# Patient Record
Sex: MALE | Race: WHITE | URBAN - METROPOLITAN AREA
[De-identification: names, ages, dates, MRNs, and addresses within clinical notes are randomized per-mention and may not be internally consistent; named-entity substitution may affect disease eponyms.]

---

## 2024-01-30 ENCOUNTER — LAB OUTSIDE AN ENCOUNTER (OUTPATIENT)
Dept: URBAN - METROPOLITAN AREA CLINIC 107 | Facility: CLINIC | Age: 24
End: 2024-01-30

## 2024-01-30 ENCOUNTER — OFFICE VISIT (OUTPATIENT)
Dept: URBAN - METROPOLITAN AREA CLINIC 107 | Facility: CLINIC | Age: 24
End: 2024-01-30
Payer: COMMERCIAL

## 2024-01-30 VITALS
DIASTOLIC BLOOD PRESSURE: 72 MMHG | RESPIRATION RATE: 18 BRPM | TEMPERATURE: 98.2 F | HEIGHT: 69 IN | HEART RATE: 64 BPM | BODY MASS INDEX: 23.11 KG/M2 | SYSTOLIC BLOOD PRESSURE: 116 MMHG | WEIGHT: 156 LBS

## 2024-01-30 DIAGNOSIS — R10.84 GENERALIZED ABDOMINAL PAIN: ICD-10-CM

## 2024-01-30 DIAGNOSIS — K58.0 IRRITABLE BOWEL SYNDROME WITH DIARRHEA: ICD-10-CM

## 2024-01-30 PROBLEM — 197125005: Status: ACTIVE | Noted: 2024-01-30

## 2024-01-30 PROCEDURE — 99204 OFFICE O/P NEW MOD 45 MIN: CPT | Performed by: STUDENT IN AN ORGANIZED HEALTH CARE EDUCATION/TRAINING PROGRAM

## 2024-01-30 NOTE — HPI-TODAY'S VISIT:
Initial 1/30/2024 Mr. Hanna is a 23-year-old male with history of anxiety/depression being referred to the GI clinic for abdominal pain associate with nausea and change in bowel habits. Labs 10/16/2023: WBC 4.3, hemoglobin 14.7, platelet 166, BUN 20, creatinine 0.8, sodium 138, potassium 4.4, liver enzymes unremarkable, testosterone and TSH within normal limits. Patient states that he has been having GI symptoms for several years.  He describes postprandial bowel movements that range in consistency from liquid to flaky to formed, no overt bleeding per rectum.  His stools are sometimes dark in color but not black.  He does not know any specific food triggers for this.  He does admit to increased abdominal gas and cramping.  On one occasion he did have heartburn however this spontaneously resolved.  He had a colonoscopy in 2020 that was overall unremarkable per patient, we will request this report.  He has no family history of colon cancer in his first-degree relatives.  He denies weight loss.  He denies rectal bleeding.  Denies dysphagia.  He does admit to having anxiety related to the symptoms.

## 2024-03-19 ENCOUNTER — OV EP (OUTPATIENT)
Dept: URBAN - METROPOLITAN AREA CLINIC 107 | Facility: CLINIC | Age: 24
End: 2024-03-19

## 2024-04-09 ENCOUNTER — OV EP (OUTPATIENT)
Dept: URBAN - METROPOLITAN AREA CLINIC 107 | Facility: CLINIC | Age: 24
End: 2024-04-09
Payer: COMMERCIAL

## 2024-04-09 VITALS
HEIGHT: 69 IN | SYSTOLIC BLOOD PRESSURE: 159 MMHG | WEIGHT: 152 LBS | DIASTOLIC BLOOD PRESSURE: 57 MMHG | HEART RATE: 63 BPM | BODY MASS INDEX: 22.51 KG/M2 | TEMPERATURE: 98.2 F

## 2024-04-09 DIAGNOSIS — R10.84 GENERALIZED ABDOMINAL PAIN: ICD-10-CM

## 2024-04-09 DIAGNOSIS — K62.5 BRIGHT RED BLOOD PER RECTUM: ICD-10-CM

## 2024-04-09 DIAGNOSIS — L91.8 SKIN TAG: ICD-10-CM

## 2024-04-09 DIAGNOSIS — K58.0 IRRITABLE BOWEL SYNDROME WITH DIARRHEA: ICD-10-CM

## 2024-04-09 PROCEDURE — 99214 OFFICE O/P EST MOD 30 MIN: CPT | Performed by: STUDENT IN AN ORGANIZED HEALTH CARE EDUCATION/TRAINING PROGRAM

## 2024-04-09 PROCEDURE — 46600 DIAGNOSTIC ANOSCOPY SPX: CPT | Performed by: STUDENT IN AN ORGANIZED HEALTH CARE EDUCATION/TRAINING PROGRAM

## 2024-04-09 RX ORDER — FEXOFENADINE HCL 60 MG/1
1 TABLET TABLET, FILM COATED ORAL TWICE A DAY
Status: ACTIVE | COMMUNITY

## 2024-04-09 NOTE — EXAM-PHYSICAL EXAM
Very small posterior skin tag. Anoscopy: No obvious hemorrhoids, rectal masses, or bleeding present. I did not get a thorough evaluation as anoscopy was not well tolerated.  NIESHA: Normal sphincter tone and rectal muscle upon bearing down. No tenderness on exam.  Chaperone present

## 2024-04-09 NOTE — HPI-OTHER HISTORIES
Colonoscopy 5/29/2020 was normal.  Random colon biopsy normal. Right upper quadrant ultrasound 2/6/2024 was normal. Labs 3/22/2024.  Celiac panel negative.  CBC, CMP normal.

## 2024-04-09 NOTE — HPI-TODAY'S VISIT:
23-year-old male here for follow-up appointment for abdominal pain.  Last seen 1/30/2024 for generalized abdominal pain and IBS with diarrhea.  Labs ordered for further evaluation.  Prior colonoscopy report requested.  Benefiber recommended to bulk stool and right upper quadrant ultrasound ordered for his abdominal pain.  Was prescribed Levsin in the past.  Today he reports continued altered bowel habits.  Has been doing well for the past few weeks up until a couple of days ago when diarrhea started. Has nausea in the morning,  Pain is periumbilical.   He was having some bleeding, stools are sometimes dark which is similar to previous. He does admit to having anxiety related to the symptoms.  Weight is up 4 pounds from his last appointment.  He has no family history of colon cancer in his first-degree relatives.

## 2024-05-10 ENCOUNTER — TELEPHONE ENCOUNTER (OUTPATIENT)
Dept: URBAN - METROPOLITAN AREA CLINIC 107 | Facility: CLINIC | Age: 24
End: 2024-05-10

## 2024-07-09 ENCOUNTER — OFFICE VISIT (OUTPATIENT)
Dept: URBAN - METROPOLITAN AREA CLINIC 107 | Facility: CLINIC | Age: 24
End: 2024-07-09
Payer: COMMERCIAL

## 2024-07-09 ENCOUNTER — LAB OUTSIDE AN ENCOUNTER (OUTPATIENT)
Dept: URBAN - METROPOLITAN AREA CLINIC 107 | Facility: CLINIC | Age: 24
End: 2024-07-09

## 2024-07-09 ENCOUNTER — TELEPHONE ENCOUNTER (OUTPATIENT)
Dept: URBAN - METROPOLITAN AREA CLINIC 107 | Facility: CLINIC | Age: 24
End: 2024-07-09

## 2024-07-09 ENCOUNTER — DASHBOARD ENCOUNTERS (OUTPATIENT)
Age: 24
End: 2024-07-09

## 2024-07-09 VITALS
BODY MASS INDEX: 24.68 KG/M2 | TEMPERATURE: 98.1 F | HEART RATE: 62 BPM | HEIGHT: 69 IN | SYSTOLIC BLOOD PRESSURE: 113 MMHG | WEIGHT: 166.6 LBS | DIASTOLIC BLOOD PRESSURE: 68 MMHG | OXYGEN SATURATION: 97 % | RESPIRATION RATE: 18 BRPM

## 2024-07-09 DIAGNOSIS — R10.13 EPIGASTRIC PAIN: ICD-10-CM

## 2024-07-09 DIAGNOSIS — K58.0 IRRITABLE BOWEL SYNDROME WITH DIARRHEA: ICD-10-CM

## 2024-07-09 DIAGNOSIS — R14.3 PASSING GAS: ICD-10-CM

## 2024-07-09 PROCEDURE — 99213 OFFICE O/P EST LOW 20 MIN: CPT | Performed by: STUDENT IN AN ORGANIZED HEALTH CARE EDUCATION/TRAINING PROGRAM

## 2024-07-09 RX ORDER — FEXOFENADINE HCL 60 MG/1
1 TABLET TABLET, FILM COATED ORAL TWICE A DAY
Status: ON HOLD | COMMUNITY

## 2024-07-09 RX ORDER — OMEGA-3/DHA/EPA/FISH OIL 120-180 MG
1 CAPSULE CAPSULE ORAL ONCE A DAY
Status: ACTIVE | COMMUNITY

## 2024-07-09 NOTE — HPI-TODAY'S VISIT:
23-year-old male here for follow-up appointment.    Last seen 4/9/2024 for IBS diarrhea, gas, rectal skin tag and bright red blood per rectum.  No bleeding noted on exam.  Advised start Benefiber daily, low FODMAP diet to identify food triggers.  Offered hyoscyamine he declined. Today he reports continued altered bowel habits. Has been doing well for the past few weeks up until a couple of days ago when diarrhea started. Has nausea in the morning, Pain is periumbilical. He was having some bleeding, stools are sometimes dark which is similar to previous. He does admit to having anxiety related to the symptoms. Weight is up 4 pounds from his last appointment.  1/30/2024 seen for generalized abdominal pain and IBS with diarrhea.  Labs ordered for further evaluation.  Prior colonoscopy report requested.  Benefiber recommended to bulk stool and right upper quadrant ultrasound ordered for his abdominal pain.  Was prescribed Levsin in the past. He has no family history of colon cancer in his first-degree relatives.  7/9/24.  He reports symptoms are unchanged. Tried taking the fiber for a week but no improvement. When he is feeling well he doesn't remeber to take the fiber.  He reports diarrhea this past week. Pain in his epigastric area and is constant.  He is trying to build muscle. He eats a bagel and cheese with santamaria.  Eats rice and broccoli in the [place]. Drinks protein shakes and milk.  Weight is up 14 pounds since his last appointment  He is not interested in any medicine to help his symptoms.

## 2024-07-15 ENCOUNTER — TELEPHONE ENCOUNTER (OUTPATIENT)
Dept: URBAN - METROPOLITAN AREA CLINIC 113 | Facility: CLINIC | Age: 24
End: 2024-07-15

## 2024-07-23 ENCOUNTER — CLAIMS CREATED FROM THE CLAIM WINDOW (OUTPATIENT)
Dept: URBAN - METROPOLITAN AREA CLINIC 4 | Facility: CLINIC | Age: 24
End: 2024-07-23
Payer: COMMERCIAL

## 2024-07-23 ENCOUNTER — CLAIMS CREATED FROM THE CLAIM WINDOW (OUTPATIENT)
Dept: URBAN - METROPOLITAN AREA SURGERY CENTER 25 | Facility: SURGERY CENTER | Age: 24
End: 2024-07-23
Payer: COMMERCIAL

## 2024-07-23 DIAGNOSIS — K31.89 GASTRIC FOVEOLAR HYPERPLASIA: ICD-10-CM

## 2024-07-23 DIAGNOSIS — K29.71 GASTRITIS, UNSPECIFIED, WITH BLEEDING: ICD-10-CM

## 2024-07-23 DIAGNOSIS — R19.7 DIARRHEA: ICD-10-CM

## 2024-07-23 DIAGNOSIS — K22.89 OTHER SPECIFIED DISEASE OF ESOPHAGUS: ICD-10-CM

## 2024-07-23 DIAGNOSIS — R10.13 EPIGASTRIC PAIN: ICD-10-CM

## 2024-07-23 DIAGNOSIS — K44.9 HIATAL HERNIA: ICD-10-CM

## 2024-07-23 DIAGNOSIS — R10.13 EPIGASTRIC ABDOMINAL PAIN: ICD-10-CM

## 2024-07-23 DIAGNOSIS — R19.7 DIARRHEA, UNSPECIFIED TYPE: ICD-10-CM

## 2024-07-23 DIAGNOSIS — K31.89 OTHER DISEASES OF STOMACH AND DUODENUM: ICD-10-CM

## 2024-07-23 PROCEDURE — 88312 SPECIAL STAINS GROUP 1: CPT | Performed by: PATHOLOGY

## 2024-07-23 PROCEDURE — 88305 TISSUE EXAM BY PATHOLOGIST: CPT | Performed by: PATHOLOGY

## 2024-07-23 PROCEDURE — 00731 ANES UPR GI NDSC PX NOS: CPT | Performed by: ANESTHESIOLOGY

## 2024-07-23 PROCEDURE — 00731 ANES UPR GI NDSC PX NOS: CPT | Performed by: NURSE ANESTHETIST, CERTIFIED REGISTERED

## 2024-07-23 PROCEDURE — 43239 EGD BIOPSY SINGLE/MULTIPLE: CPT | Performed by: STUDENT IN AN ORGANIZED HEALTH CARE EDUCATION/TRAINING PROGRAM

## 2024-07-23 RX ORDER — FEXOFENADINE HCL 60 MG/1
1 TABLET TABLET, FILM COATED ORAL TWICE A DAY
Status: ON HOLD | COMMUNITY

## 2024-07-23 RX ORDER — OMEGA-3/DHA/EPA/FISH OIL 120-180 MG
1 CAPSULE CAPSULE ORAL ONCE A DAY
Status: ACTIVE | COMMUNITY

## 2024-07-23 RX ORDER — LANSOPRAZOLE 30 MG/1
1 CAPSULE BEFORE A MEAL CAPSULE, DELAYED RELEASE ORAL TWICE A DAY
Qty: 180 CAPSULE | Refills: 1 | OUTPATIENT
Start: 2024-07-23

## 2024-07-31 ENCOUNTER — TELEPHONE ENCOUNTER (OUTPATIENT)
Dept: URBAN - METROPOLITAN AREA CLINIC 107 | Facility: CLINIC | Age: 24
End: 2024-07-31

## 2024-08-14 ENCOUNTER — OFFICE VISIT (OUTPATIENT)
Dept: URBAN - METROPOLITAN AREA CLINIC 107 | Facility: CLINIC | Age: 24
End: 2024-08-14
Payer: COMMERCIAL

## 2024-08-14 VITALS
WEIGHT: 165.2 LBS | SYSTOLIC BLOOD PRESSURE: 129 MMHG | OXYGEN SATURATION: 99 % | HEIGHT: 69 IN | DIASTOLIC BLOOD PRESSURE: 67 MMHG | TEMPERATURE: 98.1 F | BODY MASS INDEX: 24.47 KG/M2 | RESPIRATION RATE: 18 BRPM | HEART RATE: 47 BPM

## 2024-08-14 DIAGNOSIS — K44.9 HIATAL HERNIA: ICD-10-CM

## 2024-08-14 DIAGNOSIS — K58.0 IRRITABLE BOWEL SYNDROME WITH DIARRHEA: ICD-10-CM

## 2024-08-14 DIAGNOSIS — K29.51 CHRONIC GASTRITIS WITH BLEEDING, UNSPECIFIED GASTRITIS TYPE: ICD-10-CM

## 2024-08-14 PROBLEM — 8493009: Status: ACTIVE | Noted: 2024-08-14

## 2024-08-14 PROCEDURE — 99213 OFFICE O/P EST LOW 20 MIN: CPT | Performed by: NURSE PRACTITIONER

## 2024-08-14 RX ORDER — OMEGA-3/DHA/EPA/FISH OIL 120-180 MG
1 CAPSULE CAPSULE ORAL ONCE A DAY
Status: ACTIVE | COMMUNITY

## 2024-08-14 RX ORDER — LANSOPRAZOLE 30 MG/1
1 CAPSULE BEFORE A MEAL CAPSULE, DELAYED RELEASE ORAL TWICE A DAY
Qty: 180 CAPSULE | Refills: 1 | Status: DISCONTINUED | COMMUNITY
Start: 2024-07-23

## 2024-08-14 RX ORDER — FEXOFENADINE HCL 60 MG/1
1 TABLET TABLET, FILM COATED ORAL TWICE A DAY
Status: ON HOLD | COMMUNITY

## 2024-08-14 NOTE — HPI-TODAY'S VISIT:
23-year-old male here for follow-up appointment.    Last seen 4/9/2024 for IBS diarrhea, gas, rectal skin tag and bright red blood per rectum.  No bleeding noted on exam.  Advised start Benefiber daily, low FODMAP diet to identify food triggers.  Offered hyoscyamine he declined. Today he reports continued altered bowel habits. Has been doing well for the past few weeks up until a couple of days ago when diarrhea started. Has nausea in the morning, Pain is periumbilical. He was having some bleeding, stools are sometimes dark which is similar to previous. He does admit to having anxiety related to the symptoms. Weight is up 4 pounds from his last appointment.  1/30/2024 seen for generalized abdominal pain and IBS with diarrhea.  Labs ordered for further evaluation.  Prior colonoscopy report requested.  Benefiber recommended to bulk stool and right upper quadrant ultrasound ordered for his abdominal pain.  Was prescribed Levsin in the past. He has no family history of colon cancer in his first-degree relatives.  7/9/24.  He reports symptoms are unchanged. Tried taking the fiber for a week but no improvement. When he is feeling well he doesn't remeber to take the fiber.  He reports diarrhea this past week. Pain in his epigastric area and is constant.  He is trying to build muscle. He eats a bagel and cheese with santamaria.  Eats rice and broccoli in the [place]. Drinks protein shakes and milk.  Weight is up 14 pounds since his last appointment  He is not interested in any medicine to help his symptoms. Plan: Low FODMAP diet to identify triggers. He declined hyoscyamine. Benefiber recommended possible lactose intolerance was provided with information. EGD ordered for further evaluation.  8/14/2024 EGD 7/23/24 Revealed Hill grade 4 GE flap with 3 cm hiatal hernia present.  Gastritis with hemorrhage greatest by adherent blood and erythema.  Recommend omeprazole 40 mg twice a day for 3 months.  Duodenal biopsy normal.  Stomach antrum biopsy revealed foveolar hyperplasia no H. pylori or IM. Today he is doing fair.  He did not start the omeprazole due to concern about side effects.  He does have acid reflux in the morning.  No melena or hematochezia. Occasional epigastric pain. No altered bowel movements.

## 2024-08-20 ENCOUNTER — TELEPHONE ENCOUNTER (OUTPATIENT)
Dept: URBAN - METROPOLITAN AREA CLINIC 107 | Facility: CLINIC | Age: 24
End: 2024-08-20

## 2024-08-20 RX ORDER — VONOPRAZAN FUMARATE 26.72 MG/1
1 TABLET TABLET ORAL ONCE A DAY
Qty: 30 | Refills: 1 | OUTPATIENT
Start: 2024-08-23 | End: 2024-10-22

## 2024-08-21 PROBLEM — 266433003: Status: ACTIVE | Noted: 2024-08-21

## 2024-09-11 ENCOUNTER — TELEPHONE ENCOUNTER (OUTPATIENT)
Dept: URBAN - METROPOLITAN AREA CLINIC 107 | Facility: CLINIC | Age: 24
End: 2024-09-11

## 2024-09-13 ENCOUNTER — TELEPHONE ENCOUNTER (OUTPATIENT)
Dept: URBAN - METROPOLITAN AREA CLINIC 113 | Facility: CLINIC | Age: 24
End: 2024-09-13

## 2024-09-13 RX ORDER — HYOSCYAMINE SULFATE 0.125 MG
1 TABLET ON THE TONGUE AND ALLOW TO DISSOLVE TABLET,DISINTEGRATING ORAL
Qty: 90 | Refills: 1 | OUTPATIENT
Start: 2024-09-13 | End: 2024-11-11

## 2024-09-17 ENCOUNTER — OFFICE VISIT (OUTPATIENT)
Dept: URBAN - METROPOLITAN AREA CLINIC 107 | Facility: CLINIC | Age: 24
End: 2024-09-17
Payer: COMMERCIAL

## 2024-09-17 VITALS
HEIGHT: 69 IN | BODY MASS INDEX: 23.55 KG/M2 | WEIGHT: 159 LBS | SYSTOLIC BLOOD PRESSURE: 95 MMHG | OXYGEN SATURATION: 98 % | TEMPERATURE: 98.1 F | DIASTOLIC BLOOD PRESSURE: 69 MMHG | HEART RATE: 54 BPM | RESPIRATION RATE: 18 BRPM

## 2024-09-17 DIAGNOSIS — K44.9 HIATAL HERNIA: ICD-10-CM

## 2024-09-17 DIAGNOSIS — K29.51 CHRONIC GASTRITIS WITH BLEEDING, UNSPECIFIED GASTRITIS TYPE: ICD-10-CM

## 2024-09-17 DIAGNOSIS — K58.0 IRRITABLE BOWEL SYNDROME WITH DIARRHEA: ICD-10-CM

## 2024-09-17 PROCEDURE — 99214 OFFICE O/P EST MOD 30 MIN: CPT | Performed by: NURSE PRACTITIONER

## 2024-09-17 RX ORDER — VONOPRAZAN FUMARATE 26.72 MG/1
1 TABLET TABLET ORAL ONCE A DAY
Qty: 30 | Refills: 1 | Status: ACTIVE | COMMUNITY
Start: 2024-08-23 | End: 2024-10-22

## 2024-09-17 RX ORDER — OMEGA-3/DHA/EPA/FISH OIL 120-180 MG
1 CAPSULE CAPSULE ORAL ONCE A DAY
Status: ACTIVE | COMMUNITY

## 2024-09-17 RX ORDER — HYOSCYAMINE SULFATE 0.125 MG
1 TABLET ON THE TONGUE AND ALLOW TO DISSOLVE TABLET,DISINTEGRATING ORAL
Qty: 90 | Refills: 1 | Status: ACTIVE | COMMUNITY
Start: 2024-09-13 | End: 2024-11-11

## 2024-09-17 RX ORDER — FEXOFENADINE HCL 60 MG/1
1 TABLET TABLET, FILM COATED ORAL TWICE A DAY
Status: ON HOLD | COMMUNITY

## 2024-09-17 NOTE — HPI-TODAY'S VISIT:
23-year-old male here for follow-up appointment.    Last seen 4/9/2024 for IBS diarrhea, gas, rectal skin tag and bright red blood per rectum.  No bleeding noted on exam.  Advised start Benefiber daily, low FODMAP diet to identify food triggers.  Offered hyoscyamine he declined. Today he reports continued altered bowel habits. Has been doing well for the past few weeks up until a couple of days ago when diarrhea started. Has nausea in the morning, Pain is periumbilical. He was having some bleeding, stools are sometimes dark which is similar to previous. He does admit to having anxiety related to the symptoms. Weight is up 4 pounds from his last appointment.  1/30/2024 seen for generalized abdominal pain and IBS with diarrhea.  Labs ordered for further evaluation.  Prior colonoscopy report requested.  Benefiber recommended to bulk stool and right upper quadrant ultrasound ordered for his abdominal pain.  Was prescribed Levsin in the past. He has no family history of colon cancer in his first-degree relatives.  7/9/24.  He reports symptoms are unchanged. Tried taking the fiber for a week but no improvement. When he is feeling well he doesn't remeber to take the fiber.  He reports diarrhea this past week. Pain in his epigastric area and is constant.  He is trying to build muscle. He eats a bagel and cheese with santamaria.  Eats rice and broccoli in the [place]. Drinks protein shakes and milk.  Weight is up 14 pounds since his last appointment  He is not interested in any medicine to help his symptoms. Plan: Low FODMAP diet to identify triggers. He declined hyoscyamine. Benefiber recommended possible lactose intolerance was provided with information. EGD ordered for further evaluation.  8/14/2024 EGD 7/23/24 Revealed Hill grade 4 GE flap with 3 cm hiatal hernia present.  Gastritis with hemorrhage greatest by adherent blood and erythema.  Recommend omeprazole 40 mg twice a day for 3 months.  Duodenal biopsy normal.  Stomach antrum biopsy revealed foveolar hyperplasia no H. pylori or IM. Today he is doing fair.  He did not start the omeprazole due to concern about side effects.  He does have acid reflux in the morning.  No melena or hematochezia. Occasional epigastric pain. No altered bowel movements.  Advised to start his PPI previously prescribed.  Fiber for IBS.  Interval history, 9/17/2024 He called 8/20/2024 with dysphagia, started after taking omeprazole 40 mg twice a day.  Was switched over to Voquezna 20 mg daily. He came into the office 9/13/2024 reporting abdominal pain.  Hyoscyamine was prescribed.  Advised to the ER if worse.  He reports he didn't  the Hyoscyamine medicine.  He is able to work through the pain.  He had the feeling he needs to belch, but is not able to get the air out.  He thinks his hernia has been present for a long time and thinks he may have developed the hernia while he was in the . He reports bloating.  Denies any constipation, melena or hematochezia.  Weight is down 6 pounds from his last appointment

## 2024-11-18 ENCOUNTER — OFFICE VISIT (OUTPATIENT)
Dept: URBAN - METROPOLITAN AREA CLINIC 107 | Facility: CLINIC | Age: 24
End: 2024-11-18

## 2024-12-17 ENCOUNTER — OFFICE VISIT (OUTPATIENT)
Dept: URBAN - METROPOLITAN AREA CLINIC 107 | Facility: CLINIC | Age: 24
End: 2024-12-17
Payer: COMMERCIAL

## 2024-12-17 VITALS
WEIGHT: 144.5 LBS | BODY MASS INDEX: 21.4 KG/M2 | SYSTOLIC BLOOD PRESSURE: 138 MMHG | HEART RATE: 62 BPM | HEIGHT: 69 IN | TEMPERATURE: 97.2 F | DIASTOLIC BLOOD PRESSURE: 84 MMHG

## 2024-12-17 DIAGNOSIS — K58.0 IRRITABLE BOWEL SYNDROME WITH DIARRHEA: ICD-10-CM

## 2024-12-17 DIAGNOSIS — K44.9 HIATAL HERNIA: ICD-10-CM

## 2024-12-17 DIAGNOSIS — K29.51 CHRONIC GASTRITIS WITH BLEEDING, UNSPECIFIED GASTRITIS TYPE: ICD-10-CM

## 2024-12-17 PROCEDURE — 99214 OFFICE O/P EST MOD 30 MIN: CPT | Performed by: NURSE PRACTITIONER

## 2024-12-17 RX ORDER — OMEGA-3/DHA/EPA/FISH OIL 120-180 MG
1 CAPSULE CAPSULE ORAL ONCE A DAY
Status: ACTIVE | COMMUNITY

## 2024-12-17 RX ORDER — FEXOFENADINE HCL 60 MG/1
1 TABLET TABLET, FILM COATED ORAL TWICE A DAY
Status: ON HOLD | COMMUNITY

## 2024-12-17 NOTE — HPI-TODAY'S VISIT:
23-year-old male here for follow-up appointment.    Last seen 9/17/2024 for chronic gastritis, hiatal hernia and IBS diarrhea. He did not tolerate PPI Voquezna helped but caused bloating. Was advised to try IBgard or FD guard. Consider Xifaxan for SIBO. Can try over-the-counter probiotic and start fiber supplement daily.  Last seen 4/9/2024 for IBS diarrhea, gas, rectal skin tag and bright red blood per rectum.  No bleeding noted on exam.  Advised start Benefiber daily, low FODMAP diet to identify food triggers.  Offered hyoscyamine he declined. Today he reports continued altered bowel habits. Has been doing well for the past few weeks up until a couple of days ago when diarrhea started. Has nausea in the morning, Pain is periumbilical. He was having some bleeding, stools are sometimes dark which is similar to previous. He does admit to having anxiety related to the symptoms. Weight is up 4 pounds from his last appointment.  1/30/2024 seen for generalized abdominal pain and IBS with diarrhea.  Labs ordered for further evaluation.  Prior colonoscopy report requested.  Benefiber recommended to bulk stool and right upper quadrant ultrasound ordered for his abdominal pain.  Was prescribed Levsin in the past. He has no family history of colon cancer in his first-degree relatives.  7/9/24.  He reports symptoms are unchanged. Tried taking the fiber for a week but no improvement. When he is feeling well he doesn't remember to take the fiber.  He reports diarrhea this past week. Pain in his epigastric area and is constant.  He is trying to build muscle. He eats a bagel and cheese with santamaria.  Eats rice and broccoli in the afternoon. Drinks protein shakes and milk.  Weight is up 14 pounds since his last appointment  He is not interested in any medicine to help his symptoms. Plan: Low FODMAP diet to identify triggers. He declined hyoscyamine. Benefiber recommended possible lactose intolerance was provided with information. EGD ordered for further evaluation.  8/14/2024 EGD 7/23/24 Revealed Hill grade 4 GE flap with 3 cm hiatal hernia present.  Gastritis with hemorrhage greatest by adherent blood and erythema.  Recommend omeprazole 40 mg twice a day for 3 months.  Duodenal biopsy normal.  Stomach antrum biopsy revealed foveolar hyperplasia no H. pylori or IM. Today he is doing fair.  He did not start the omeprazole due to concern about side effects.  He does have acid reflux in the morning.  No melena or hematochezia. Occasional epigastric pain. No altered bowel movements.  Advised to start his PPI previously prescribed.  Fiber for IBS.  Interval history, 9/17/2024 He called 8/20/2024 with dysphagia, started after taking omeprazole 40 mg twice a day.  Was switched over to Voquezna 20 mg daily. He came into the office 9/13/2024 reporting abdominal pain.  Hyoscyamine was prescribed.  Advised to the ER if worse.  He reports he didn't  the Hyoscyamine medicine.  He is able to work through the pain.  He had the feeling he needs to belch, but is not able to get the air out.  He thinks his hernia has been present for a long time and thinks he may have developed the hernia while he was in the . He reports bloating.  Denies any constipation, melena or hematochezia.  Weight is down 6 pounds from his last appointment  Interval history, 12/17/2024 Labs 11/12/2024.  CBC normal with Hgb 14.7.  CMP normal.  Hemoglobin A1c 5.7, TSH normal. Reports current constipation, he is on a high protein diet.  Has intermittent abdominal pain but overall better. Last had pain 2 weeks ago. No nausea or vomiting. Has fiber at home but hasn't been taking it much. He is sleeping well. On vegan protein.   Voquezna helped and he completed all the samples.  No melena or hematochezia. Had dental surgery recently. Was on antibiotics, ibuprofen and hydrocodone. He only takes the hydrocodone at night. Today was his last day of ibuprofen.  Weight was 158 at home

## 2025-06-17 ENCOUNTER — OFFICE VISIT (OUTPATIENT)
Dept: URBAN - METROPOLITAN AREA CLINIC 107 | Facility: CLINIC | Age: 25
End: 2025-06-17

## 2025-07-23 ENCOUNTER — OFFICE VISIT (OUTPATIENT)
Dept: URBAN - METROPOLITAN AREA CLINIC 107 | Facility: CLINIC | Age: 25
End: 2025-07-23

## 2025-07-23 RX ORDER — OMEGA-3/DHA/EPA/FISH OIL 120-180 MG
1 CAPSULE CAPSULE ORAL ONCE A DAY
Status: ACTIVE | COMMUNITY

## 2025-07-23 RX ORDER — FEXOFENADINE HCL 60 MG/1
1 TABLET TABLET, FILM COATED ORAL TWICE A DAY
Status: ON HOLD | COMMUNITY

## 2025-07-23 NOTE — HPI-TODAY'S VISIT:
23-year-old male here for follow-up appointment.    Last seen 9/17/2024 for chronic gastritis, hiatal hernia and IBS diarrhea. He did not tolerate PPI Voquezna helped but caused bloating. Was advised to try IBgard or FD guard. Consider Xifaxan for SIBO. Can try over-the-counter probiotic and start fiber supplement daily.  Last seen 4/9/2024 for IBS diarrhea, gas, rectal skin tag and bright red blood per rectum.  No bleeding noted on exam.  Advised start Benefiber daily, low FODMAP diet to identify food triggers.  Offered hyoscyamine he declined. Today he reports continued altered bowel habits. Has been doing well for the past few weeks up until a couple of days ago when diarrhea started. Has nausea in the morning, Pain is periumbilical. He was having some bleeding, stools are sometimes dark which is similar to previous. He does admit to having anxiety related to the symptoms. Weight is up 4 pounds from his last appointment.  1/30/2024 seen for generalized abdominal pain and IBS with diarrhea.  Labs ordered for further evaluation.  Prior colonoscopy report requested.  Benefiber recommended to bulk stool and right upper quadrant ultrasound ordered for his abdominal pain.  Was prescribed Levsin in the past. He has no family history of colon cancer in his first-degree relatives.  7/9/24.  He reports symptoms are unchanged. Tried taking the fiber for a week but no improvement. When he is feeling well he doesn't remember to take the fiber.  He reports diarrhea this past week. Pain in his epigastric area and is constant.  He is trying to build muscle. He eats a bagel and cheese with santamaria.  Eats rice and broccoli in the afternoon. Drinks protein shakes and milk.  Weight is up 14 pounds since his last appointment  He is not interested in any medicine to help his symptoms. Plan: Low FODMAP diet to identify triggers. He declined hyoscyamine. Benefiber recommended possible lactose intolerance was provided with information. EGD ordered for further evaluation.  8/14/2024 EGD 7/23/24 Revealed Hill grade 4 GE flap with 3 cm hiatal hernia present.  Gastritis with hemorrhage greatest by adherent blood and erythema.  Recommend omeprazole 40 mg twice a day for 3 months.  Duodenal biopsy normal.  Stomach antrum biopsy revealed foveolar hyperplasia no H. pylori or IM. Today he is doing fair.  He did not start the omeprazole due to concern about side effects.  He does have acid reflux in the morning.  No melena or hematochezia. Occasional epigastric pain. No altered bowel movements.  Advised to start his PPI previously prescribed.  Fiber for IBS.  Interval history, 9/17/2024 He called 8/20/2024 with dysphagia, started after taking omeprazole 40 mg twice a day.  Was switched over to Voquezna 20 mg daily. He came into the office 9/13/2024 reporting abdominal pain.  Hyoscyamine was prescribed.  Advised to the ER if worse.  He reports he didn't  the Hyoscyamine medicine.  He is able to work through the pain.  He had the feeling he needs to belch, but is not able to get the air out.  He thinks his hernia has been present for a long time and thinks he may have developed the hernia while he was in the . He reports bloating.  Denies any constipation, melena or hematochezia.  Weight is down 6 pounds from his last appointment  Interval history, 12/17/2024 Labs 11/12/2024.  CBC normal with Hgb 14.7.  CMP normal.  Hemoglobin A1c 5.7, TSH normal. Reports current constipation, he is on a high protein diet.  Has intermittent abdominal pain but overall better. Last had pain 2 weeks ago. No nausea or vomiting. Has fiber at home but hasn't been taking it much. He is sleeping well. On vegan protein.   Voquezna helped and he completed all the samples.  No melena or hematochezia. Had dental surgery recently. Was on antibiotics, ibuprofen and hydrocodone. He only takes the hydrocodone at night. Today was his last day of ibuprofen.  Weight was 158 at home Last seen 12/17/2024 for chronic gastritis, hiatal hernia and diarrhea.  He did not tolerate PPI twice a day.  Voquezna helped he was out of samples he was provided with more samples of 10 mg dose advised to call if he would like prescription.  Advised to remain off NSAIDs can use Tylenol as needed for pain.  For IBS with diarrhea recommend fiber supplement titrate to effect. Interval history, 7/22/2025 Labs 3/14/2025.  Thyroid studies and CBC normal with hemoglobin 15.3. Labs 5/16/2025.  CMP:BUN 21.  Hemoglobin A1c normal at 5.6.